# Patient Record
Sex: MALE | Race: WHITE | NOT HISPANIC OR LATINO | URBAN - METROPOLITAN AREA
[De-identification: names, ages, dates, MRNs, and addresses within clinical notes are randomized per-mention and may not be internally consistent; named-entity substitution may affect disease eponyms.]

---

## 2018-08-30 ENCOUNTER — IMPORTED ENCOUNTER (OUTPATIENT)
Dept: URBAN - METROPOLITAN AREA CLINIC 50 | Facility: CLINIC | Age: 33
End: 2018-08-30

## 2019-04-16 NOTE — PATIENT DISCUSSION
The patient was informed that with [address] [facility] for distance, they will need glasses for all near and intermediate activities after surgery. The patient understands there is a possibility they may need an enhancement after surgery. The patient elects Custom Vision OD, goal of emmetropia.

## 2019-04-16 NOTE — PATIENT DISCUSSION
Discussed monovision. This helps lessen the dependence on glasses, but is a compromise and glasses are often still needed for some activities including driving and binocular vision.

## 2019-04-29 NOTE — PATIENT DISCUSSION
The patient was informed that with [address] [facility] for distance, they will need glasses for all near and intermediate activities after surgery. The patient understands there is a possibility they may need an enhancement after surgery. The patient elects Custom Vision OD, goal of emmetropia.

## 2019-04-29 NOTE — PATIENT DISCUSSION
The patient was informed that with [address] [facility] for distance, they will need glasses for all near and intermediate activities after surgery. The patient understands there is a possibility they may need an enhancement after surgery. The patient elects Custom Vision OD, goal of emmetropia.

## 2019-04-30 NOTE — PATIENT DISCUSSION
Cataract surgery has been performed in the first eye and activities of daily living are still impaired. The patient would like to proceed with cataract surgery in the second eye as scheduled. The patient elects Custom Monovision OS, goal of -2.00.

## 2019-05-06 NOTE — PATIENT DISCUSSION
As long as the patient stays in [place]. It will be fine.     Thank you,   Tami Butler   [facility] Department   [phone]    Patient is cleared for anesthesia.

## 2019-09-12 NOTE — PROCEDURE NOTE: SURGICAL
<p>Prior to commencing surgery patient identification, surgical procedure, site, and side were confirmed by Dr. Pham Restrepo. Following topical proparacaine anesthesia, the patient was positioned at the YAG laser, a contact lens coupled to the cornea with methylcellulose and an axial posterior capsulotomy performed without complication using 3.8 Mj x 27. Excess methylcellulose was washed from the eye, one drop of Alphagan was instilled and the patient returned to the holding area having tolerated the procedure well and without complication. </p><p>MRN:[id number]</p>

## 2019-12-17 NOTE — PROCEDURE NOTE: SURGICAL
<p>Prior to commencing surgery patient identification, surgical procedure, site, and side were confirmed by Dr. Jez Rodriguez. Following topical proparacaine anesthesia, the patient was positioned at the YAG laser, a contact lens coupled to the cornea with methylcellulose and an axial posterior capsulotomy performed without complication using 3.4 Mj x 20. Excess methylcellulose was washed from the eye, one drop of Alphagan was instilled and the patient returned to the holding area having tolerated the procedure well and without complication. </p><p>MRN:[id number]</p><p>&nbsp;</p>

## 2020-03-17 NOTE — PATIENT DISCUSSION
patient is 1 month post - is doing very well. Explained that refraction and astigmatism can change wildly over the next 4-5 months after epi lasek (unlike i lasik), especially in an eye that has multiple corneal refractive surgeries.   In the mean time, patient should obtain contact lens in the left eye (possibly toric) to help her do near work with that eye, although BCVA OS at this time will be between 20/25-20/30 until cornea is fully healed.

## 2020-08-11 NOTE — PATIENT DISCUSSION
Clin called patient and explain to patient you had appointment at 11:30am for therapy and not came so every think ok. Patient states our power out so I can't make it. Confirmed next appointment. Patient is a candidate for all options.

## 2021-04-17 ASSESSMENT — VISUAL ACUITY
OD_CC: J1+
OD_SC: 20/20-
OS_CC: J1+
OS_SC: 20/20

## 2021-04-17 ASSESSMENT — TONOMETRY
OS_IOP_MMHG: 16
OD_IOP_MMHG: 15

## 2022-02-11 NOTE — PATIENT DISCUSSION
----- Message from Eun Muniz sent at 2/11/2022  8:56 AM CST -----  Regarding: EAR ACHE  PT STS HE WAS SEEN IN CLINIC ON 02/10/22  FOR EAR ACHE PT STS HIS EAR ACHE IS WORSE PLEASE CALL [phone] THANKS! :)     Happy with current Monovision without glasses.

## 2022-12-16 NOTE — PATIENT DISCUSSION
Subjective:  Patient is here today for follow-up on hypertension.  He is taking amlodipine-benazepril 10/20 mg once daily.  He denies side effects with this medication.  He denies chest pain, shortness of breath, headaches, dizziness, or swelling of the lower extremities.  He has been noticing that his fingers get her cold and sometimes will become whitish in color.  This will last for about 20 minutes into when he gets back inside warm billing.  He does not notice any bluish or reddish coloration associated with it.  It is not always the same fingers sometimes it will be alone hand and other times on the other hand but usually not all the fingers.  He is not had any problems with cold toes.  He does not usually wear gloves outside.    Current Outpatient Medications   Medication Sig Dispense Refill   • amlodipine-benazepril (LOTREL) 10-20 MG per capsule Take 1 capsule by mouth daily. 90 capsule 1   • Multiple Vitamin (MULTIVITAMIN ADULT PO)        No current facility-administered medications for this visit.     ALLERGIES:  No Known Allergies    Past Medical History:   Diagnosis Date   • Alcohol abuse     20+ years.  stopped in 7/22     Past Surgical History:   Procedure Laterality Date   • No past surgeries       Social History     Tobacco Use   • Smoking status: Never   • Smokeless tobacco: Never   Vaping Use   • Vaping Use: never used   Substance Use Topics   • Alcohol use: Yes     Comment: occasionally   • Drug use: Never     Objective:  Patient is alert and oriented, no acute distress, non-ill-appearing.  Visit Vitals  /82 (BP Location: LUE - Left upper extremity, Patient Position: Sitting, Cuff Size: Regular)   Pulse 64   Temp 97.6 °F (36.4 °C) (Temporal)   Resp 18   Ht 5' 11\" (1.803 m)   Wt 68.5 kg (151 lb 1.6 oz)   BMI 21.07 kg/m²     Lungs:  Clear to auscultation without rales, rhonchi, or wheezes.  Heart:  Regular rate and rhythm without murmur or extra sounds.  Abdomen:  Soft, nontender, no masses or  The patient is 5 weeks status post cataract surgery. The eye has healed well with no signs of infection or inflammation. All surgery associated drops may be stopped. A glasses prescription, if needed, was prescribed. hepatosplenomegaly.  Upper extremities:  His right 3rd finger is pale compared to the rest of the fingers but normal temperature.  Capillary refill time is less than 3 seconds on all fingers.  Sensation is normal.    Assessment:   1. Essential hypertension    2. Cold hands      Plan:  Continue current medications.  Recommended using hand warmers and wearing gloves.  He may need to wear 2 pairs of gloves to keep his fingers warm.  Follow-up if he starts noticing more problems with this fingers.  Otherwise follow-up in 6 months for recheck.  Immunizations recommended but declined.    Supervising Physician:   Dr. Sebastien Torres    Recent PHQ 2/9 Score    PHQ 2:  Date Adult PHQ 2 Score Adult PHQ 2 Interpretation   12/16/2022 0 No further screening needed       PHQ 9:       DEPRESSION ASSESSMENT/PLAN:  Depression screening is negative no further plan needed.     I spent a total of 20 minutes on the day of the visit.  This includes chart review and documenting.

## 2023-10-09 ENCOUNTER — NEW PATIENT (OUTPATIENT)
Dept: URBAN - METROPOLITAN AREA CLINIC 49 | Facility: LOCATION | Age: 38
End: 2023-10-09

## 2023-10-09 DIAGNOSIS — H25.13: ICD-10-CM

## 2023-10-09 DIAGNOSIS — H52.4: ICD-10-CM

## 2023-10-09 DIAGNOSIS — H04.123: ICD-10-CM

## 2023-10-09 PROCEDURE — 92004 COMPRE OPH EXAM NEW PT 1/>: CPT

## 2023-10-09 PROCEDURE — 92015 DETERMINE REFRACTIVE STATE: CPT

## 2023-10-09 ASSESSMENT — TONOMETRY
OS_IOP_MMHG: 18
OD_IOP_MMHG: 18

## 2023-10-09 ASSESSMENT — VISUAL ACUITY
OU_SC: 20/20
OS_SC: 20/20-2
OU_SC: J1+@17"
OD_SC: 20/20-1